# Patient Record
Sex: MALE | Race: AMERICAN INDIAN OR ALASKA NATIVE
[De-identification: names, ages, dates, MRNs, and addresses within clinical notes are randomized per-mention and may not be internally consistent; named-entity substitution may affect disease eponyms.]

---

## 2018-05-17 ENCOUNTER — HOSPITAL ENCOUNTER (OUTPATIENT)
Dept: HOSPITAL 5 - GIO | Age: 68
Discharge: HOME | End: 2018-05-17
Attending: INTERNAL MEDICINE
Payer: MEDICARE

## 2018-05-17 VITALS — DIASTOLIC BLOOD PRESSURE: 88 MMHG | SYSTOLIC BLOOD PRESSURE: 190 MMHG

## 2018-05-17 DIAGNOSIS — E66.9: ICD-10-CM

## 2018-05-17 DIAGNOSIS — D12.3: ICD-10-CM

## 2018-05-17 DIAGNOSIS — I10: ICD-10-CM

## 2018-05-17 DIAGNOSIS — G47.30: ICD-10-CM

## 2018-05-17 DIAGNOSIS — Z86.010: ICD-10-CM

## 2018-05-17 DIAGNOSIS — K64.8: ICD-10-CM

## 2018-05-17 DIAGNOSIS — Z09: Primary | ICD-10-CM

## 2018-05-17 DIAGNOSIS — Z87.891: ICD-10-CM

## 2018-05-17 DIAGNOSIS — K57.30: ICD-10-CM

## 2018-05-17 DIAGNOSIS — E11.9: ICD-10-CM

## 2018-05-17 DIAGNOSIS — E78.00: ICD-10-CM

## 2018-05-17 DIAGNOSIS — Z79.4: ICD-10-CM

## 2018-05-17 PROCEDURE — 82962 GLUCOSE BLOOD TEST: CPT

## 2018-05-17 PROCEDURE — 45385 COLONOSCOPY W/LESION REMOVAL: CPT

## 2018-05-17 PROCEDURE — 88305 TISSUE EXAM BY PATHOLOGIST: CPT

## 2018-05-17 PROCEDURE — 45381 COLONOSCOPY SUBMUCOUS NJX: CPT

## 2018-05-17 NOTE — ANESTHESIA CONSULTATION
Anesthesia Consult and Med Hx


Date of service: 05/17/18





- Airway


Anesthetic Teeth Evaluation: Poor


ROM Head & Neck: Adequate


Mental/Hyoid Distance: Adequate


Mallampati Class: Class III


Intubation Access Assessment: Possibly Difficult





- Pre-Operative Health Status


ASA Pre-Surgery Classification: ASA3


Proposed Anesthetic Plan: MAC





- Pulmonary


Hx Smoking: Yes (former smoker)


Hx Sleep Apnea: Yes (snoring)





- Cardiovascular System


Hx Hypertension: Yes


Hx Heart Attack/AMI: No (high cholesterol)





- Central Nervous System


Hx Back Pain: Yes





- Other Systems


Hx Obesity: Yes

## 2018-05-17 NOTE — OPERATIVE REPORT
Operative Report


Operative Report: 


Date of procedure: 05/17/2018





Procedure: Colonoscopy with submucosal injection and multiple snare 

polypectomies.





Attending physician: Abelardo Lopez MD





: Abelardo Lopez MD





Indication: Patient is a 68-year-old male who presents for screening 

colonoscopy.  Patient has a past history of colon polyps.  The colonoscopy 

serves to evaluate patient for colorectal cancer screening.





Consent: Informed consent was obtained after advising the patient and family 

regarding nature of this procedure, its indications, potential benefits as well 

as possible complications including but not limited to bleeding perforation and 

adverse reaction to medication, infection as well as other cardiopulmonary 

complications.  An informed written and verbal consent was then obtained after 

due opportunity was provided for questions and answers.





Monitoring: Patient was monitored continuously with pulse oximetry and 

electrocardiographic recordings as well as blood pressure recordings.  Vital 

signs remained stable throughout this procedure with no untoward events.





Preoperative assessment: Patient was assessed immediately prior to this 

procedure for capacity to tolerate monitored anesthesia care and moderate 

sedation as well as general anesthesia.  Patient's ASA classification is 3, 

Mallampati class is 2, Hyomental distance is 3.





Instrument: Yap[employer] video colonoscope





Medications: Propofol given intravenously in divided doses.  For details please 

refer to anesthesia records.





Description of procedure: Patient was placed in the left lateral decubitus 

position after achieving sedation, a digital rectal examination was performed 

following which the colonoscope was introduced into the anal verge and advanced 

to the cecum which was identified by the cecal valve, the appendiceal orifice, 

as well as by the cecal strap and direct transillumination.  The colonoscope 

was subsequently withdrawn with careful inspection of all mucosal surfaces.  

Patient tolerated this procedure well and was subsequently taken to the 

recovery room.  The following findings were noted.





Findings: Patient had extensive diverticulosis involving all segments of the 

colon with large wide mouth diverticula seen particularly in the cecum 

ascending colon descending colon and sigmoid colon.  Patient had 3 flat polyps 

each measuring approximately 1.5 cm in the transverse colon.  These were 

elevated with submucosal injection of saline and removed by snare 

electrocautery and retrieved.  There was substantial retained stool seen in 

various segments of the colon in particular in the cecum and ascending colon 

where this affected visualization significantly.  Also there  was retained semi-

formed stool in sigmoid colon.  The rest of the colon to the cecum was normal.  

On the retroflex view at the anal verge, patient had  prominent internal 

hemorrhoids.





Impression: Multiple transverse colon polyp status post submucosal injection 

and snare electrocautery.  


Severe colonic diverticulosis.


Prominent large Internal hemorrhoids.





Plan: Follow pathology report


High-fiber diet.


Repeat colonoscopy in 1 year due to substantial retained stool and multiple 

colon polyps.


Patient will likely benefit from hemorrhoidal band ligation in the future

## 2018-05-17 NOTE — DISCHARGE SUMMARY
Short Stay Discharge Plan


Activity: advance as tolerated


Weight Bearing Status: Weight Bear as Tolerated


Diet: regular


Follow up with: 


ERINNE,SAMUEL C, MD [Primary Care Provider] - 7 Days